# Patient Record
Sex: FEMALE | Race: WHITE | NOT HISPANIC OR LATINO | Employment: OTHER | URBAN - METROPOLITAN AREA
[De-identification: names, ages, dates, MRNs, and addresses within clinical notes are randomized per-mention and may not be internally consistent; named-entity substitution may affect disease eponyms.]

---

## 2019-05-07 ENCOUNTER — HOSPITAL ENCOUNTER (EMERGENCY)
Facility: HOSPITAL | Age: 74
Discharge: HOME | End: 2019-05-07
Attending: EMERGENCY MEDICINE
Payer: MEDICARE

## 2019-05-07 VITALS
WEIGHT: 113.1 LBS | BODY MASS INDEX: 17.75 KG/M2 | TEMPERATURE: 98.3 F | DIASTOLIC BLOOD PRESSURE: 85 MMHG | HEIGHT: 67 IN | SYSTOLIC BLOOD PRESSURE: 143 MMHG | OXYGEN SATURATION: 97 % | RESPIRATION RATE: 18 BRPM | HEART RATE: 89 BPM
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
## 2019-05-07 PROCEDURE — 99282 EMERGENCY DEPT VISIT SF MDM: CPT

## 2019-05-07 PROCEDURE — 99283 EMERGENCY DEPT VISIT LOW MDM: CPT | Performed by: EMERGENCY MEDICINE

## 2019-05-07 RX ORDER — DOXYCYCLINE HYCLATE 100 MG/1
200 CAPSULE ORAL ONCE
Status: COMPLETED | OUTPATIENT
Start: 2019-05-07 | End: 2019-05-07

## 2019-05-07 RX ADMIN — DOXYCYCLINE HYCLATE 200 MG: 100 CAPSULE ORAL at 19:04

## 2019-10-02 ENCOUNTER — HOSPITAL ENCOUNTER (EMERGENCY)
Facility: HOSPITAL | Age: 74
Discharge: HOME | End: 2019-10-03
Attending: EMERGENCY MEDICINE | Admitting: EMERGENCY MEDICINE
Payer: MEDICARE

## 2019-10-02 DIAGNOSIS — S90.31XA CONTUSION OF RIGHT FOOT: Primary | ICD-10-CM

## 2019-10-02 PROCEDURE — 99283 EMERGENCY DEPT VISIT LOW MDM: CPT

## 2019-10-03 ENCOUNTER — APPOINTMENT (EMERGENCY)
Dept: RADIOLOGY | Facility: HOSPITAL | Age: 74
End: 2019-10-03
Payer: MEDICARE

## 2019-10-03 VITALS
RESPIRATION RATE: 16 BRPM | SYSTOLIC BLOOD PRESSURE: 149 MMHG | WEIGHT: 115.96 LBS | OXYGEN SATURATION: 97 % | HEIGHT: 67 IN | DIASTOLIC BLOOD PRESSURE: 64 MMHG | TEMPERATURE: 97.8 F | BODY MASS INDEX: 18.2 KG/M2 | HEART RATE: 71 BPM

## 2019-10-03 PROCEDURE — 99284 EMERGENCY DEPT VISIT MOD MDM: CPT | Performed by: EMERGENCY MEDICINE

## 2019-10-03 PROCEDURE — 73630 X-RAY EXAM OF FOOT: CPT

## 2019-10-03 RX ORDER — ACETAMINOPHEN 325 MG/1
650 TABLET ORAL ONCE
Status: COMPLETED | OUTPATIENT
Start: 2019-10-03 | End: 2019-10-03

## 2019-10-03 RX ADMIN — ACETAMINOPHEN 650 MG: 325 TABLET, FILM COATED ORAL at 01:04

## 2019-10-03 NOTE — ED PROVIDER NOTES
History  Chief Complaint   Patient presents with    Foot Pain     Patient came in for foot pain; states she twisted her foot while trying to get up  No falls  67 y/o female presents to the ED for right foot pain x 2 hours  Patient states that she stood up from the couch and twisted her right foot  States that she has had pain, swelling, and difficulty ambulating on it since  She denies any prior injury or surgery to the area  Denies any numbness/tingling/weakness to the extremity  She states that she has been needing to use a cane to ambulate since  She denies any other injury  States that she did not fall to the ground  No head strike or LOC  States that she did take aspirin prior to arrival, which provided minimal relief  No other complaints  History provided by:  Patient  Foot Injury - Major   Location:  Foot  Time since incident:  2 hours  Foot location:  R foot  Pain details:     Quality:  Throbbing    Radiates to:  Does not radiate    Severity:  Moderate    Onset quality:  Sudden    Duration:  2 hours    Timing:  Constant    Progression:  Worsening  Chronicity:  New  Prior injury to area:  No  Relieved by:  Nothing  Worsened by:  Bearing weight  Ineffective treatments:  None tried  Associated symptoms: swelling    Associated symptoms: no decreased ROM, no fever, no neck pain, no numbness and no tingling        Prior to Admission Medications   Prescriptions Last Dose Informant Patient Reported? Taking?   oxyCODONE-acetaminophen (PERCOCET) 5-325 mg per tablet   No No   Sig: Take 1-2 tablets by mouth every 6 (six) hours as needed for severe pain Max Daily Amount: 8 tablets      Facility-Administered Medications: None       History reviewed  No pertinent past medical history  History reviewed  No pertinent surgical history  History reviewed  No pertinent family history  I have reviewed and agree with the history as documented      Social History     Tobacco Use    Smoking status: Current Every Day Smoker     Packs/day: 0 50     Types: Cigarettes    Smokeless tobacco: Never Used   Substance Use Topics    Alcohol use: No    Drug use: No        Review of Systems   Constitutional: Negative for chills and fever  HENT: Negative for congestion, ear pain and sore throat  Eyes: Negative for pain and visual disturbance  Respiratory: Negative for cough, shortness of breath and wheezing  Cardiovascular: Negative for chest pain and leg swelling  Gastrointestinal: Negative for abdominal pain, diarrhea, nausea and vomiting  Genitourinary: Negative for dysuria, frequency, hematuria and urgency  Musculoskeletal: Negative for neck pain and neck stiffness  Skin: Negative for rash and wound  Neurological: Negative for weakness, numbness and headaches  Psychiatric/Behavioral: Negative for agitation and confusion  All other systems reviewed and are negative  Physical Exam  Physical Exam   Constitutional: She is oriented to person, place, and time  She appears well-developed and well-nourished  HENT:   Head: Normocephalic and atraumatic  Mouth/Throat: Oropharynx is clear and moist    Eyes: Pupils are equal, round, and reactive to light  EOM are normal    Neck: Normal range of motion  Neck supple  Cardiovascular: Normal rate and regular rhythm  Pulmonary/Chest: Effort normal and breath sounds normal  No stridor  No respiratory distress  She has no wheezes  She has no rales  She exhibits no tenderness  Abdominal: Soft  Bowel sounds are normal  She exhibits no distension  There is no tenderness  Musculoskeletal: Normal range of motion  She exhibits edema and tenderness  Tenderness and swelling to the right lateral mid foot and 4th/5th digits  Neurological: She is alert and oriented to person, place, and time  No focal deficits   Skin: Skin is warm and dry  Nursing note and vitals reviewed        Vital Signs  ED Triage Vitals   Temperature Pulse Respirations Blood Pressure SpO2   10/03/19 0004 10/03/19 0004 10/03/19 0004 10/03/19 0000 10/03/19 0004   97 8 °F (36 6 °C) 71 16 149/64 97 %      Temp Source Heart Rate Source Patient Position - Orthostatic VS BP Location FiO2 (%)   10/03/19 0004 10/03/19 0004 10/03/19 0004 10/03/19 0004 --   Oral Monitor Lying Right arm       Pain Score       10/03/19 0240       2           Vitals:    10/03/19 0000 10/03/19 0004   BP: 149/64 149/64   Pulse:  71   Patient Position - Orthostatic VS:  Lying         Visual Acuity      ED Medications  Medications   acetaminophen (TYLENOL) tablet 650 mg (650 mg Oral Given 10/3/19 0104)       Diagnostic Studies  Results Reviewed     None                 XR foot 3+ views RIGHT    (Results Pending)              Procedures  Procedures       ED Course                               MDM  Number of Diagnoses or Management Options  Contusion of right foot: new and requires workup  Diagnosis management comments: Patient with right foot pain s/p fall- will get xray and r/o fracture  Will give tylenol and re-assess     Patient reevaluated and feels improved  Patient updated on results of tests  Discharge instructions given including follow-up, and return precautions  Patient demonstrates verbal understanding and agrees with plan             Amount and/or Complexity of Data Reviewed  Clinical lab tests: ordered and reviewed  Tests in the radiology section of CPT®: ordered and reviewed  Tests in the medicine section of CPT®: ordered and reviewed  Discussion of test results with the performing providers: yes  Decide to obtain previous medical records or to obtain history from someone other than the patient: yes  Obtain history from someone other than the patient: yes  Review and summarize past medical records: yes  Discuss the patient with other providers: yes  Independent visualization of images, tracings, or specimens: yes    Patient Progress  Patient progress: improved      Disposition  Final diagnoses:   Contusion of right foot     Time reflects when diagnosis was documented in both MDM as applicable and the Disposition within this note     Time User Action Codes Description Comment    10/3/2019  2:32 AM Hannah Stephens Add [S90 [id number]] Contusion of right foot       ED Disposition     ED Disposition Condition Date/Time Comment    Discharge Stable Thu Oct 3, 2019  2:32 AM Yanci Murguia discharge to home/self care  Follow-up Information     Follow up With Specialties Details Why Contact Info Additional [address] [facility] [place] [facility] Call in 1 day for follow up within 2-3 days [address] [facility] [address] [phone] [facility] [place], [id number] [place] [address], [phone] [facility] Emergency Department Emergency Medicine Go to  immediately for any new or worsening symptoms  [address] [facility] [id number]  [address] [place] [address] ED, 9 [place], [place], [address]          Discharge Medication List as of 10/3/2019  2:34 AM      CONTINUE these medications which have NOT CHANGED    Details   oxyCODONE-acetaminophen (PERCOCET) 5-325 mg per tablet Take 1-2 tablets by mouth every 6 (six) hours as needed for severe pain Max Daily Amount: 8 tablets, Starting 11/29/2016, Until Discontinued, Print           No discharge procedures on file      ED Provider  Electronically Signed by           Jaqueline Sandhoff, DO  10/03/19 0405

## 2020-11-26 ENCOUNTER — HOSPITAL ENCOUNTER (EMERGENCY)
Facility: HOSPITAL | Age: 75
Discharge: HOME | End: 2020-11-26
Attending: EMERGENCY MEDICINE | Admitting: EMERGENCY MEDICINE
Payer: MEDICARE

## 2020-11-26 VITALS
OXYGEN SATURATION: 95 % | DIASTOLIC BLOOD PRESSURE: 72 MMHG | SYSTOLIC BLOOD PRESSURE: 161 MMHG | HEART RATE: 76 BPM | RESPIRATION RATE: 20 BRPM | TEMPERATURE: 97.7 F
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
## 2020-11-26 DIAGNOSIS — S80.862A TICK BITE OF LEFT LOWER LEG, INITIAL ENCOUNTER: Primary | ICD-10-CM

## 2020-11-26 PROCEDURE — 99284 EMERGENCY DEPT VISIT MOD MDM: CPT | Performed by: PHYSICIAN ASSISTANT

## 2020-11-26 PROCEDURE — 99282 EMERGENCY DEPT VISIT SF MDM: CPT

## 2020-11-26 RX ORDER — DOXYCYCLINE HYCLATE 100 MG/1
200 CAPSULE ORAL ONCE
Status: COMPLETED | OUTPATIENT
Start: 2020-11-26 | End: 2020-11-26

## 2020-11-26 RX ADMIN — DOXYCYCLINE 200 MG: 100 CAPSULE ORAL at 20:55

## 2023-05-02 RX ORDER — PROMETHAZINE HYDROCHLORIDE 25 MG/1
TABLET ORAL
COMMUNITY

## 2023-05-02 RX ORDER — CIPROFLOXACIN 500 MG/1
TABLET, FILM COATED ORAL
COMMUNITY

## 2023-05-02 RX ORDER — PREDNISONE 50 MG/1
TABLET ORAL
COMMUNITY

## 2023-05-02 RX ORDER — DOXYCYCLINE 100 MG/1
TABLET ORAL
COMMUNITY

## 2023-05-02 RX ORDER — DICYCLOMINE HCL 20 MG
20 TABLET ORAL EVERY 6 HOURS PRN
COMMUNITY
Start: 2023-04-13

## 2023-05-02 RX ORDER — ECHINACEA PURPUREA EXTRACT 125 MG
TABLET ORAL
COMMUNITY

## 2023-05-02 RX ORDER — FLUCONAZOLE 150 MG/1
TABLET ORAL
COMMUNITY

## 2023-05-02 RX ORDER — AZITHROMYCIN 250 MG/1
TABLET, FILM COATED ORAL
COMMUNITY

## 2023-05-02 RX ORDER — METRONIDAZOLE 500 MG/1
TABLET ORAL
COMMUNITY

## 2023-05-02 RX ORDER — OSELTAMIVIR PHOSPHATE 75 MG/1
CAPSULE ORAL
COMMUNITY

## 2023-05-02 RX ORDER — ALBUTEROL SULFATE 90 UG/1
AEROSOL, METERED RESPIRATORY (INHALATION)
COMMUNITY

## 2023-05-02 RX ORDER — CIPROFLOXACIN AND DEXAMETHASONE 3; 1 MG/ML; MG/ML
SUSPENSION/ DROPS AURICULAR (OTIC)
COMMUNITY

## 2023-05-02 RX ORDER — LOPERAMIDE HYDROCHLORIDE 2 MG/1
2 CAPSULE ORAL 4 TIMES DAILY PRN
COMMUNITY
Start: 2023-04-28 | End: 2023-05-08

## 2023-05-03 ENCOUNTER — OFFICE VISIT (OUTPATIENT)
Dept: GASTROENTEROLOGY | Facility: CLINIC | Age: 78
End: 2023-05-03

## 2023-05-03 VITALS
WEIGHT: 102.6 LBS | HEIGHT: 67 IN | BODY MASS INDEX: 16.1 KG/M2 | DIASTOLIC BLOOD PRESSURE: 60 MMHG | SYSTOLIC BLOOD PRESSURE: 114 MMHG | HEART RATE: 73 BPM | OXYGEN SATURATION: 96 %

## 2023-05-03 DIAGNOSIS — R19.7 DIARRHEA, UNSPECIFIED TYPE: Primary | ICD-10-CM

## 2023-05-03 NOTE — LETTER
May 5, 2023     Tenisha Vizcaino, [facility]  [address] [facility]  [address]    Patient: Leesa Nissen   YOB: 1945   Date of Visit: 5/3/2023       Dear Dr Jose G Burgess:    Thank you for referring Charles Logan to me for evaluation  Below are my notes for this consultation  If you have questions, please do not hesitate to call me  I look forward to following your patient along with you  Sincerely,        Deedee Camp DO        CC: No Recipients  Priti Spears  5/3/2023  2:23 PM  Signed  [facility] - Outpatient Consultation  Ana Valencia 76 y o  female MRN: [id number]  Encounter: [id number]          ASSESSMENT AND PLAN:      1  Diarrhea, unspecified type  -No plans for she denies any heartburn  There is no bloating or gaseousness  Colonoscopy at this time  -Imodium half a tablet in the morning time either daily or every other day  -Regular diet  -No indication for further CT scanning  -Reassurance was provided to the patient  -Follow-up in 2 weeks time    ______________________________________________________________________    HPI: This 33-year-old female presents to the office with complaint of diarrhea  She states that this diarrhea began the day after Easter  The diarrhea was occurring up to 4-10 times daily  It was not associated with any rectal bleeding  There have been no foreign travel  The patient stated however that she did not receive any antibiotics  She came in contact with no one with a similar illness  Her last colonoscopy I performed in June 2016 which was normal   Based on the persistence of the diarrhea she first presented to the emergency department on April 13, 2023  I reviewed this entire note  The significant findings at the time of that ED visit was that CT scan was performed and suggested thickening of the rectum consistent with proctitis  There is no family history for inflammatory bowel disease    Stool testing was performed and was negative for C  difficile as well as negative for enteric pathogen's  WBC was 5 1 and the hemoglobin level was 10 7  Patient returned back to the emergency room on 2 separate occasions because of the persistence of the diarrhea  CT scan that was  performed 4/22/2023 showed no evidence of proctitis  Nevertheless, the patient continues to experience up to 4 episodes of loose or watery diarrhea 4 times daily  Sometimes the levels are associated with some white mucus  She stated that she did take a dose of Imodium back on April 26 and after that she had constipation for 1 full day  Liver enzyme panel on April 22 was completely normal   Celiac panel was performed and found to be negative for TTG IgA  C-reactive protein was performed and found to be 12 1  REVIEW OF SYSTEMS:    CONSTITUTIONAL: Denies any fever, chills, rigors, and weight loss  HEENT: No earache or tinnitus  Denies hearing loss or visual disturbances  CARDIOVASCULAR: No chest pain or palpitations  RESPIRATORY: Denies any cough, hemoptysis, shortness of breath or dyspnea on exertion  GASTROINTESTINAL: As noted in the History of Present Illness  GENITOURINARY: No problems with urination  Denies any hematuria or dysuria  NEUROLOGIC: No dizziness or vertigo, denies headaches  MUSCULOSKELETAL: Denies any muscle or joint pain  SKIN: Denies skin rashes or itching  ENDOCRINE: Denies excessive thirst  Denies intolerance to heat or cold  PSYCHOSOCIAL: Denies depression or anxiety  Denies any recent memory loss  Historical Information   History reviewed  No pertinent past medical history  History reviewed  No pertinent surgical history    Social History   Social History     Substance and Sexual Activity   Alcohol Use No     Social History     Substance and Sexual Activity   Drug Use No     Social History     Tobacco Use   Smoking Status Every Day    Packs/day: 0 50    Types: Cigarettes   Smokeless Tobacco Never "    History reviewed  No pertinent family history  Meds/Allergies        Current Outpatient Medications:     fluconazole (DIFLUCAN) 150 mg tablet    Probiotic Product (PROBIOTIC PO)    albuterol (PROVENTIL HFA,VENTOLIN HFA) 90 mcg/act inhaler    azithromycin (ZITHROMAX) 250 mg tablet    ciprofloxacin (CIPRO) 500 mg tablet    ciprofloxacin-dexamethasone (CIPRODEX) otic suspension    dicyclomine (BENTYL) 20 mg tablet    doxycycline (ADOXA) 100 MG tablet    loperamide (IMODIUM) 2 mg capsule    metroNIDAZOLE (FLAGYL) 500 mg tablet    nystatin-triamcinolone (MYCOLOG-II) cream    oseltamivir (TAMIFLU) 75 mg capsule    oxyCODONE-acetaminophen (PERCOCET) 5-325 mg per tablet    predniSONE 50 mg tablet    promethazine (PHENERGAN) 25 mg tablet    sodium chloride (OCEAN) 0 65 % nasal spray    Allergies   Allergen Reactions    Tetanus Toxoids Hives    Doxycycline Diarrhea    Medical Tape Other (See Comments) and Itching    Oxycodone-Acetaminophen Other (See Comments)     Shakes, felt faint    Paclitaxel Other (See Comments)     Severe rashes            Objective      Blood pressure 114/60, pulse 73, height 5' 7\" (1 702 m), weight 46 5 kg (102 lb 9 6 oz), SpO2 96 %  Body mass index is 16 07 kg/m²  PHYSICAL EXAM:      General Appearance:   Alert, cooperative, no distress   HEENT:   Normocephalic, atraumatic, anicteric      Neck:  Supple, symmetrical, trachea midline   Lungs:   Clear to auscultation bilaterally; no rales, rhonchi or wheezing; respirations unlabored    Heart[de-identified]   Regular rate and rhythm; no murmur, rub, or gallop     Abdomen:   Soft, non-tender, non-distended; normal bowel sounds; no masses, no organomegaly    Genitalia:   Deferred    Rectal:   Deferred    Extremities:  No cyanosis, clubbing or edema    Pulses:  2+ and symmetric    Skin:  No jaundice, rashes, or lesions    Lymph nodes:  No palpable cervical lymphadenopathy        Lab Results:   No visits with results within 1 Day(s) from " this visit  Latest known visit with results is:   No results found for any previous visit  Radiology Results:   No results found

## 2023-05-03 NOTE — PROGRESS NOTES
[facility] - Outpatient Consultation  Fiona Valencia 76 y o  female MRN: [id number]  Encounter: [id number]          ASSESSMENT AND PLAN:      1  Diarrhea, unspecified type  -No plans for she denies any heartburn  There is no bloating or gaseousness  Colonoscopy at this time  -Imodium half a tablet in the morning time either daily or every other day  -Regular diet  -No indication for further CT scanning  -Reassurance was provided to the patient  -Follow-up in 2 weeks time    ______________________________________________________________________    HPI: This 77-year-old female presents to the office with complaint of diarrhea  She states that this diarrhea began the day after Easter  The diarrhea was occurring up to 4-10 times daily  It was not associated with any rectal bleeding  There have been no foreign travel  The patient stated however that she did not receive any antibiotics  She came in contact with no one with a similar illness  Her last colonoscopy I performed in June 2016 which was normal   Based on the persistence of the diarrhea she first presented to the emergency department on April 13, 2023  I reviewed this entire note  The significant findings at the time of that ED visit was that CT scan was performed and suggested thickening of the rectum consistent with proctitis  There is no family history for inflammatory bowel disease  Stool testing was performed and was negative for C  difficile as well as negative for enteric pathogen's  WBC was 5 1 and the hemoglobin level was 10 7  Patient returned back to the emergency room on 2 separate occasions because of the persistence of the diarrhea  CT scan that was  performed 4/22/2023 showed no evidence of proctitis  Nevertheless, the patient continues to experience up to 4 episodes of loose or watery diarrhea 4 times daily  Sometimes the levels are associated with some white mucus    She stated that she did take a dose of Imodium back on April 26 and after that she had constipation for 1 full day  Liver enzyme panel on April 22 was completely normal   Celiac panel was performed and found to be negative for TTG IgA  C-reactive protein was performed and found to be 12 1  REVIEW OF SYSTEMS:    CONSTITUTIONAL: Denies any fever, chills, rigors, and weight loss  HEENT: No earache or tinnitus  Denies hearing loss or visual disturbances  CARDIOVASCULAR: No chest pain or palpitations  RESPIRATORY: Denies any cough, hemoptysis, shortness of breath or dyspnea on exertion  GASTROINTESTINAL: As noted in the History of Present Illness  GENITOURINARY: No problems with urination  Denies any hematuria or dysuria  NEUROLOGIC: No dizziness or vertigo, denies headaches  MUSCULOSKELETAL: Denies any muscle or joint pain  SKIN: Denies skin rashes or itching  ENDOCRINE: Denies excessive thirst  Denies intolerance to heat or cold  PSYCHOSOCIAL: Denies depression or anxiety  Denies any recent memory loss  Historical Information   History reviewed  No pertinent past medical history  History reviewed  No pertinent surgical history  Social History   Social History     Substance and Sexual Activity   Alcohol Use No     Social History     Substance and Sexual Activity   Drug Use No     Social History     Tobacco Use   Smoking Status Every Day    Packs/day: 0 50    Types: Cigarettes   Smokeless Tobacco Never     History reviewed  No pertinent family history      Meds/Allergies       Current Outpatient Medications:     fluconazole (DIFLUCAN) 150 mg tablet    Probiotic Product (PROBIOTIC PO)    albuterol (PROVENTIL HFA,VENTOLIN HFA) 90 mcg/act inhaler    azithromycin (ZITHROMAX) 250 mg tablet    ciprofloxacin (CIPRO) 500 mg tablet    ciprofloxacin-dexamethasone (CIPRODEX) otic suspension    dicyclomine (BENTYL) 20 mg tablet    doxycycline (ADOXA) 100 MG tablet    loperamide (IMODIUM) 2 mg capsule    metroNIDAZOLE "(FLAGYL) 500 mg tablet    nystatin-triamcinolone (MYCOLOG-II) cream    oseltamivir (TAMIFLU) 75 mg capsule    oxyCODONE-acetaminophen (PERCOCET) 5-325 mg per tablet    predniSONE 50 mg tablet    promethazine (PHENERGAN) 25 mg tablet    sodium chloride (OCEAN) 0 65 % nasal spray    Allergies   Allergen Reactions    Tetanus Toxoids Hives    Doxycycline Diarrhea    Medical Tape Other (See Comments) and Itching    Oxycodone-Acetaminophen Other (See Comments)     Shakes, felt faint    Paclitaxel Other (See Comments)     Severe rashes            Objective     Blood pressure 114/60, pulse 73, height 5' 7\" (1 702 m), weight 46 5 kg (102 lb 9 6 oz), SpO2 96 %  Body mass index is 16 07 kg/m²  PHYSICAL EXAM:      General Appearance:   Alert, cooperative, no distress   HEENT:   Normocephalic, atraumatic, anicteric      Neck:  Supple, symmetrical, trachea midline   Lungs:   Clear to auscultation bilaterally; no rales, rhonchi or wheezing; respirations unlabored    Heart[de-identified]   Regular rate and rhythm; no murmur, rub, or gallop  Abdomen:   Soft, non-tender, non-distended; normal bowel sounds; no masses, no organomegaly    Genitalia:   Deferred    Rectal:   Deferred    Extremities:  No cyanosis, clubbing or edema    Pulses:  2+ and symmetric    Skin:  No jaundice, rashes, or lesions    Lymph nodes:  No palpable cervical lymphadenopathy        Lab Results:   No visits with results within 1 Day(s) from this visit  Latest known visit with results is:   No results found for any previous visit  Radiology Results:   No results found    "

## 2023-05-17 ENCOUNTER — OFFICE VISIT (OUTPATIENT)
Dept: GASTROENTEROLOGY | Facility: CLINIC | Age: 78
End: 2023-05-17

## 2023-05-17 VITALS
WEIGHT: 103.2 LBS | DIASTOLIC BLOOD PRESSURE: 62 MMHG | HEART RATE: 99 BPM | SYSTOLIC BLOOD PRESSURE: 118 MMHG | HEIGHT: 67 IN | BODY MASS INDEX: 16.2 KG/M2 | OXYGEN SATURATION: 99 %

## 2023-05-17 DIAGNOSIS — R19.4 CHANGE IN BOWEL HABITS: Primary | ICD-10-CM

## 2023-05-17 NOTE — PROGRESS NOTES
[address] [facility] - Outpatient Follow-up Note  Ernestine Valencia 76 y o  female MRN: [id number]  Encounter: [id number]          ASSESSMENT AND PLAN:      1  Change in bowel habits  - Colonoscopy; Future  -Increase fiber intake up to 20 g of fiber per day  A fiber worksheet was handed to the patient and discussed  ______________________________________________________________________    SUBJECTIVE: Patient comes back to the office today stating that her diarrhea has improved  She stopped taking the Imodium a few days ago and the diarrhea has not recurred  However her stools have changed in caliber  In addition she is seeing white mucus covering the stool  She denies any abdominal pain but she does admit to bloating on occasion  She does not eat a lot of fiber on a daily basis  We discussed yesterday's meal and I calculated that her diet fiber intake yesterday was around 10 to 12 g of fiber  Her last colonoscopy was 7 years ago  There is no family history for colon cancer for colon polyp  there is no rectal bleeding  REVIEW OF SYSTEMS IS OTHERWISE NEGATIVE  Historical Information   History reviewed  No pertinent past medical history  History reviewed  No pertinent surgical history  Social History   Social History     Substance and Sexual Activity   Alcohol Use No     Social History     Substance and Sexual Activity   Drug Use No     Social History     Tobacco Use   Smoking Status Every Day   • Packs/day: 0 50   • Types: Cigarettes   Smokeless Tobacco Never     History reviewed  No pertinent family history      Meds/Allergies       Current Outpatient Medications:   •  albuterol (PROVENTIL HFA,VENTOLIN HFA) 90 mcg/act inhaler  •  azithromycin (ZITHROMAX) 250 mg tablet  •  ciprofloxacin (CIPRO) 500 mg tablet  •  ciprofloxacin-dexamethasone (CIPRODEX) otic suspension  •  dicyclomine (BENTYL) 20 mg tablet  •  doxycycline (ADOXA) 100 MG tablet  •  fluconazole (DIFLUCAN) 150 mg "tablet  •  metroNIDAZOLE (FLAGYL) 500 mg tablet  •  nystatin-triamcinolone (MYCOLOG-II) cream  •  oseltamivir (TAMIFLU) 75 mg capsule  •  oxyCODONE-acetaminophen (PERCOCET) 5-325 mg per tablet  •  predniSONE 50 mg tablet  •  Probiotic Product (PROBIOTIC PO)  •  promethazine (PHENERGAN) 25 mg tablet  •  sodium chloride (OCEAN) 0 65 % nasal spray    Allergies   Allergen Reactions   • Tetanus Toxoids Hives   • Doxycycline Diarrhea   • Medical Tape Other (See Comments) and Itching   • Oxycodone-Acetaminophen Other (See Comments)     Shakes, felt faint   • Paclitaxel Other (See Comments)     Severe rashes            Objective     Blood pressure 118/62, pulse 99, height 5' 7\" (1 702 m), weight 46 8 kg (103 lb 3 2 oz), SpO2 99 %  Body mass index is 16 16 kg/m²  PHYSICAL EXAM:      General Appearance:   Alert, cooperative, no distress   HEENT:   Normocephalic, atraumatic, anicteric      Neck:  Supple, symmetrical, trachea midline   Lungs:   Clear to auscultation bilaterally; no rales, rhonchi or wheezing; respirations unlabored    Heart[de-identified]   Regular rate and rhythm; no murmur, rub, or gallop  Abdomen:   Soft, non-tender, non-distended; normal bowel sounds; no masses, no organomegaly    Genitalia:   Deferred    Rectal:   Deferred    Extremities:  No cyanosis, clubbing or edema    Pulses:  2+ and symmetric    Skin:  No jaundice, rashes, or lesions    Lymph nodes:  No palpable cervical lymphadenopathy        Lab Results:   No visits with results within 1 Day(s) from this visit  Latest known visit with results is:   No results found for any previous visit  Radiology Results:   No results found    Answers for HPI/ROS submitted by the patient on 5/11/2023  Chronicity: new  Onset: 1 to 4 weeks ago  Onset quality: gradual  Frequency: rarely  Episode duration: 1 Hours  Progression since onset: resolved  Pain location: right flank  Pain - numeric: 5/10  Pain quality: a sensation of fullness  Radiates to: does not " radiate  anorexia: No  arthralgias: No  belching: Yes  constipation: No  diarrhea: Yes  dysuria: No  fever: No  flatus: Yes  frequency: No  headaches: No  hematochezia: No  hematuria: No  melena: No  myalgias: No  nausea:  No  weight loss: Yes  vomiting: No  Aggravated by: nothing, vomiting  Relieved by: being still  Diagnostic workup: CT scan

## 2023-05-31 ENCOUNTER — TELEPHONE (OUTPATIENT)
Dept: GASTROENTEROLOGY | Facility: CLINIC | Age: 78
End: 2023-05-31

## 2023-05-31 NOTE — TELEPHONE ENCOUNTER
L/M for patient to call back  Schedule Colonoscopy with Dr Priti Hanna patient still has prep Instructions

## 2023-06-01 ENCOUNTER — TELEPHONE (OUTPATIENT)
Dept: GASTROENTEROLOGY | Facility: CLINIC | Age: 78
End: 2023-06-01

## 2023-06-01 NOTE — TELEPHONE ENCOUNTER
Scheduled date of colonoscopy (as of today): 8/21/23  Physician performing colonoscopy: Dr Behzad Kim  Location of colonoscopy: [facility]  Clearances: N/A

## 2023-06-13 NOTE — TELEPHONE ENCOUNTER
Pt already schled Colonoscopy  8/21/23 with Dr Lexx Panchal - Dulco/Miralax prep Instructions given at office visit

## 2023-06-15 ENCOUNTER — TELEPHONE (OUTPATIENT)
Dept: OBGYN CLINIC | Facility: CLINIC | Age: 78
End: 2023-06-15

## 2023-07-05 ENCOUNTER — TELEPHONE (OUTPATIENT)
Age: 78
End: 2023-07-05

## 2023-07-05 NOTE — TELEPHONE ENCOUNTER
Patients GI provider:  Dr. Mainor Jane    Number to return call: [phone]    Reason for call: Patients daughter, Favio So, calling as patients PCP recommended patient have an EGD due to weight loss. Patient is scheduled for a colonoscopy and is asking if EGD can be completed at the same time, will reschedule appointment to have both completed. Patient did see Lizeth Thomas on  5/17/23.  Please advise if EGD can be completed as well     Scheduled procedure/appointment date if applicable: Procedure 2/24/53

## 2023-07-07 ENCOUNTER — PREP FOR PROCEDURE (OUTPATIENT)
Dept: GASTROENTEROLOGY | Facility: CLINIC | Age: 78
End: 2023-07-07

## 2023-07-07 DIAGNOSIS — R63.4 WEIGHT LOSS, ABNORMAL: Primary | ICD-10-CM

## 2023-07-07 NOTE — TELEPHONE ENCOUNTER
[facility]  let her know we are adding the EGD and there is no additional prepping

## 2023-08-15 ENCOUNTER — TELEPHONE (OUTPATIENT)
Dept: GASTROENTEROLOGY | Facility: CLINIC | Age: 78
End: 2023-08-15

## 2023-08-15 NOTE — TELEPHONE ENCOUNTER
Confirmed with patient her egd/colon with Dr Marleen Ritchie 8/21  Reviewed prep she has a copy  She is very hesitate about drinking all that liquid and Gatorade  I told patient to mix the Miralax with propel water and begin drinking the mixture early in the morning   She agreed but still unsure

## 2023-08-21 ENCOUNTER — HOSPITAL ENCOUNTER (OUTPATIENT)
Dept: GASTROENTEROLOGY | Facility: HOSPITAL | Age: 78
Setting detail: OUTPATIENT SURGERY
Discharge: HOME | End: 2023-08-21
Attending: INTERNAL MEDICINE | Admitting: INTERNAL MEDICINE
Payer: MEDICARE

## 2023-08-21 ENCOUNTER — ANESTHESIA (OUTPATIENT)
Dept: GASTROENTEROLOGY | Facility: HOSPITAL | Age: 78
End: 2023-08-21

## 2023-08-21 ENCOUNTER — ANESTHESIA EVENT (OUTPATIENT)
Dept: GASTROENTEROLOGY | Facility: HOSPITAL | Age: 78
End: 2023-08-21

## 2023-08-21 VITALS
TEMPERATURE: 97 F | WEIGHT: 102.51 LBS | BODY MASS INDEX: 16.09 KG/M2 | HEART RATE: 65 BPM | RESPIRATION RATE: 14 BRPM | DIASTOLIC BLOOD PRESSURE: 78 MMHG | OXYGEN SATURATION: 95 % | SYSTOLIC BLOOD PRESSURE: 158 MMHG | HEIGHT: 67 IN

## 2023-08-21 DIAGNOSIS — R63.4 WEIGHT LOSS, ABNORMAL: ICD-10-CM

## 2023-08-21 DIAGNOSIS — R19.4 CHANGE IN BOWEL HABITS: ICD-10-CM

## 2023-08-21 PROBLEM — Z72.0 TOBACCO ABUSE: Status: ACTIVE | Noted: 2022-08-24

## 2023-08-21 PROBLEM — J44.9 CHRONIC OBSTRUCTIVE LUNG DISEASE (HCC): Status: ACTIVE | Noted: 2022-08-24

## 2023-08-21 PROBLEM — Z90.11 STATUS POST RIGHT MASTECTOMY: Status: ACTIVE | Noted: 2022-02-18

## 2023-08-21 PROBLEM — J43.2 CENTRILOBULAR EMPHYSEMA (HCC): Status: ACTIVE | Noted: 2022-08-24

## 2023-08-21 PROCEDURE — 45385 COLONOSCOPY W/LESION REMOVAL: CPT | Performed by: INTERNAL MEDICINE

## 2023-08-21 PROCEDURE — 43239 EGD BIOPSY SINGLE/MULTIPLE: CPT | Performed by: INTERNAL MEDICINE

## 2023-08-21 PROCEDURE — 88342 IMHCHEM/IMCYTCHM 1ST ANTB: CPT | Performed by: STUDENT IN AN ORGANIZED HEALTH CARE EDUCATION/TRAINING PROGRAM

## 2023-08-21 PROCEDURE — 88341 IMHCHEM/IMCYTCHM EA ADD ANTB: CPT | Performed by: STUDENT IN AN ORGANIZED HEALTH CARE EDUCATION/TRAINING PROGRAM

## 2023-08-21 PROCEDURE — 88305 TISSUE EXAM BY PATHOLOGIST: CPT | Performed by: STUDENT IN AN ORGANIZED HEALTH CARE EDUCATION/TRAINING PROGRAM

## 2023-08-21 RX ORDER — SODIUM CHLORIDE, SODIUM LACTATE, POTASSIUM CHLORIDE, CALCIUM CHLORIDE 600; 310; 30; 20 MG/100ML; MG/100ML; MG/100ML; MG/100ML
INJECTION, SOLUTION INTRAVENOUS CONTINUOUS PRN
Status: DISCONTINUED | OUTPATIENT
Start: 2023-08-21 | End: 2023-08-21

## 2023-08-21 RX ORDER — SODIUM CHLORIDE, SODIUM LACTATE, POTASSIUM CHLORIDE, CALCIUM CHLORIDE 600; 310; 30; 20 MG/100ML; MG/100ML; MG/100ML; MG/100ML
125 INJECTION, SOLUTION INTRAVENOUS CONTINUOUS
Status: DISCONTINUED | OUTPATIENT
Start: 2023-08-21 | End: 2023-08-25 | Stop reason: HOSPADM

## 2023-08-21 RX ORDER — LIDOCAINE HYDROCHLORIDE 20 MG/ML
INJECTION, SOLUTION EPIDURAL; INFILTRATION; INTRACAUDAL; PERINEURAL AS NEEDED
Status: DISCONTINUED | OUTPATIENT
Start: 2023-08-21 | End: 2023-08-21

## 2023-08-21 RX ORDER — LIDOCAINE HYDROCHLORIDE 10 MG/ML
0.5 INJECTION, SOLUTION EPIDURAL; INFILTRATION; INTRACAUDAL; PERINEURAL ONCE AS NEEDED
Status: DISCONTINUED | OUTPATIENT
Start: 2023-08-21 | End: 2023-08-25 | Stop reason: HOSPADM

## 2023-08-21 RX ORDER — PROPOFOL 10 MG/ML
INJECTION, EMULSION INTRAVENOUS AS NEEDED
Status: DISCONTINUED | OUTPATIENT
Start: 2023-08-21 | End: 2023-08-21

## 2023-08-21 RX ADMIN — SODIUM CHLORIDE, SODIUM LACTATE, POTASSIUM CHLORIDE, AND CALCIUM CHLORIDE: .6; .31; .03; .02 INJECTION, SOLUTION INTRAVENOUS at 08:03

## 2023-08-21 RX ADMIN — PROPOFOL 20 MG: 10 INJECTION, EMULSION INTRAVENOUS at 08:13

## 2023-08-21 RX ADMIN — PROPOFOL 30 MG: 10 INJECTION, EMULSION INTRAVENOUS at 08:21

## 2023-08-21 RX ADMIN — LIDOCAINE HYDROCHLORIDE 60 MG: 20 INJECTION, SOLUTION EPIDURAL; INFILTRATION; INTRACAUDAL; PERINEURAL at 08:11

## 2023-08-21 RX ADMIN — PROPOFOL 20 MG: 10 INJECTION, EMULSION INTRAVENOUS at 08:27

## 2023-08-21 RX ADMIN — PROPOFOL 50 MG: 10 INJECTION, EMULSION INTRAVENOUS at 08:12

## 2023-08-21 RX ADMIN — PROPOFOL 30 MG: 10 INJECTION, EMULSION INTRAVENOUS at 08:18

## 2023-08-21 RX ADMIN — SODIUM CHLORIDE, SODIUM LACTATE, POTASSIUM CHLORIDE, AND CALCIUM CHLORIDE: .6; .31; .03; .02 INJECTION, SOLUTION INTRAVENOUS at 08:27

## 2023-08-21 RX ADMIN — PROPOFOL 30 MG: 10 INJECTION, EMULSION INTRAVENOUS at 08:15

## 2023-08-21 RX ADMIN — PROPOFOL 30 MG: 10 INJECTION, EMULSION INTRAVENOUS at 08:32

## 2023-08-21 RX ADMIN — PROPOFOL 40 MG: 10 INJECTION, EMULSION INTRAVENOUS at 08:23

## 2023-08-21 NOTE — DISCHARGE INSTRUCTIONS
Upper Endoscopy   WHAT YOU NEED TO KNOW:   An upper endoscopy is also called an upper gastrointestinal (GI) endoscopy, or an esophagogastroduodenoscopy (EGD). It is a procedure to examine the inside of your esophagus, stomach, and duodenum (first part of the small intestine) with a scope. You may feel bloated, gassy, or have some abdominal discomfort after your procedure. Your throat may be sore for 24 to 36 hours. You may burp or pass gas from air that is still inside your body. DISCHARGE INSTRUCTIONS:   Seek care immediately if:   You have sudden, severe abdominal pain. You have problems swallowing. You have a large amount of black, sticky bowel movements or blood in your bowel movements. You have sudden trouble breathing. You feel weak, lightheaded, or faint or your heart beats faster than normal for you. Contact your healthcare provider if:   You have a fever and chills. You have nausea or are vomiting. Your abdomen is bloated or feels full and hard. You have abdominal pain. You have black, sticky bowel movements or blood in your bowel movements. You have not had a bowel movement for 3 days after your procedure. You have rash or hives. You have questions or concerns about your procedure. Activity:   Do not lift, strain, or run for 24 hours after your procedure. Rest after your procedure. You have been given medicine to relax you. Do not drive or make important decisions until the day after your procedure. Return to your normal activity as directed. Relieve gas and discomfort from bloating by lying on your right side with a heating pad on your abdomen. You may need to take short walks to help the gas move out. Eat small meals until bloating is relieved. Follow up with your healthcare provider as directed: Write down your questions so you remember to ask them during your visits.      If you take a “blood thinner”, please review the specific instructions from your endoscopist about when you should resume it. These can be found in the “Recommendation” and “Your Medication list” sections of this After Visit Summary. Colonoscopy   WHAT YOU NEED TO KNOW:   A colonoscopy is a procedure to examine the inside of your colon (intestine) with a scope. Polyps or tissue growths may have been removed during your colonoscopy. It is normal to feel bloated and to have some abdominal discomfort. You should be passing gas. If you have hemorrhoids or you had polyps removed, you may have a small amount of bleeding. DISCHARGE INSTRUCTIONS:   Seek care immediately if:   You have sudden, severe abdominal pain. You have problems swallowing. You have a large amount of black, sticky bowel movements or blood in your bowel movements. You have sudden trouble breathing. You feel weak, lightheaded, or faint or your heart beats faster than normal for you. Contact your healthcare provider if:   You have a fever and chills. You have nausea or are vomiting. Your abdomen is bloated or feels full and hard. You have abdominal pain. You have black, sticky bowel movements or blood in your bowel movements. You have not had a bowel movement for 3 days after your procedure. You have rash or hives. You have questions or concerns about your procedure. Activity:   Do not lift, strain, or run for 24 hours after your procedure. Rest after your procedure. You have been given medicine to relax you. Do not drive or make important decisions until the day after your procedure. Return to your normal activity as directed. Relieve gas and discomfort from bloating by lying on your right side with a heating pad on your abdomen. You may need to take short walks to help the gas move out. Eat small meals until bloating is relieved. Follow up with your healthcare provider as directed: Write down your questions so you remember to ask them during your visits.      If you take a “blood thinner”, please review the specific instructions from your endoscopist about when you should resume it. These can be found in the “Recommendation” and “Your Medication list” sections of this After Visit Summary.

## 2023-08-21 NOTE — ANESTHESIA POSTPROCEDURE EVALUATION
Post-Op Assessment Note    CV Status:  Stable  Pain Score: 0    Pain management: adequate     Mental Status:  Sleepy   Hydration Status:  Euvolemic   PONV Controlled:  Controlled   Airway Patency:  Patent      Post Op Vitals Reviewed: Yes      Staff: Anesthesiologist, CRNA         No notable events documented.     /64 (08/21/23 0838)    Temp     Pulse 60 (08/21/23 0838)   Resp 12 (08/21/23 0838)    SpO2 99 % (08/21/23 0838)

## 2023-08-21 NOTE — ANESTHESIA PREPROCEDURE EVALUATION
Procedure:  COLONOSCOPY  EGD    Relevant Problems   ANESTHESIA (within normal limits)   (-) History of anesthesia complications      CARDIO   (-) Chest pain   (-) TORRES (dyspnea on exertion)      PULMONARY   (+) Centrilobular emphysema (HCC)   (+) Chronic obstructive lung disease (HCC)   (-) Shortness of breath   (-) URI (upper respiratory infection)      Other   (+) Status post right mastectomy   (+) Tobacco abuse      •  Left Ventricle: Systolic function is normal with an ejection fraction   of 60-65%. •  IVC/SVC: The inferior vena cava was mildly dilated. •  Tricuspid Valve: There is mild regurgitation. •  Pulmonic Valve: There is trace regurgitation. Physical Exam    Airway    Mallampati score: II  TM Distance: >3 FB  Neck ROM: full     Dental   lower dentures and upper dentures,     Cardiovascular      Pulmonary      Other Findings        Anesthesia Plan  ASA Score- 2     Anesthesia Type- IV sedation with anesthesia with ASA Monitors. Additional Monitors:   Airway Plan:           Plan Factors-Exercise tolerance (METS): >4 METS. Chart reviewed. EKG reviewed. Existing labs reviewed. Patient summary reviewed. Induction- intravenous. Postoperative Plan-     Informed Consent- Anesthetic plan and risks discussed with patient. I personally reviewed this patient with the CRNA. Discussed and agreed on the Anesthesia Plan with the CRNA. Jasmyn Porras

## 2023-08-21 NOTE — H&P
History and Physical -  [facility]  Yazminlexii Joe Valencia 78 y.o. female MRN: [id number]      HPI: Cassidy Green is a 78y.o. year old female who presents for evaluation of a change in bowel habits and weight loss      REVIEW OF SYSTEMS: Per the HPI, and otherwise unremarkable. Historical Information   Past Medical History:   Diagnosis Date   • Cancer ([address])    • COPD (chronic obstructive pulmonary disease) ([address])      Past Surgical History:   Procedure Laterality Date   • BREAST SURGERY       Social History   Social History     Substance and Sexual Activity   Alcohol Use No     Social History     Substance and Sexual Activity   Drug Use No     Social History     Tobacco Use   Smoking Status Every Day   • Packs/day: 0.50   • Types: Cigarettes   Smokeless Tobacco Never     History reviewed. No pertinent family history. Meds/Allergies     (Not in a hospital admission)      Allergies   Allergen Reactions   • Tetanus Toxoids Hives   • Doxycycline Diarrhea   • Medical Tape Other (See Comments) and Itching   • Oxycodone-Acetaminophen Other (See Comments)     Shakes, felt faint   • Paclitaxel Other (See Comments)     Severe rashes        Objective     Blood pressure 157/70, pulse 73, temperature (!) 97.2 °F (36.2 °C), temperature source Temporal, resp. rate 14, height 5' 7" (1.702 m), weight 46.5 kg (102 lb 8.2 oz), SpO2 96 %. PHYSICAL EXAM    Gen: NAD  CV: RRR  CHEST: Clear  ABD: soft, NT/ND  EXT: no edema      ASSESSMENT/PLAN:  This is a 78y.o. year old female here for EGD with biopsies, colonoscopy, and she is stable and optimized for her procedure.

## 2023-08-25 PROCEDURE — 88342 IMHCHEM/IMCYTCHM 1ST ANTB: CPT | Performed by: STUDENT IN AN ORGANIZED HEALTH CARE EDUCATION/TRAINING PROGRAM

## 2023-08-25 PROCEDURE — 88341 IMHCHEM/IMCYTCHM EA ADD ANTB: CPT | Performed by: STUDENT IN AN ORGANIZED HEALTH CARE EDUCATION/TRAINING PROGRAM

## 2023-08-25 PROCEDURE — 88305 TISSUE EXAM BY PATHOLOGIST: CPT | Performed by: STUDENT IN AN ORGANIZED HEALTH CARE EDUCATION/TRAINING PROGRAM

## 2023-08-30 ENCOUNTER — NURSE TRIAGE (OUTPATIENT)
Age: 78
End: 2023-08-30

## 2023-08-30 NOTE — TELEPHONE ENCOUNTER
Patient's daughter Benton Escobar- on communication consent, calling in with some questions regarding biopsy results. I reviewed results and answered her questions. She understood.

## 2024-11-12 ENCOUNTER — OFFICE VISIT (OUTPATIENT)
Age: 79
End: 2024-11-12
Payer: MEDICARE

## 2024-11-12 VITALS
TEMPERATURE: 98.5 F | RESPIRATION RATE: 14 BRPM | BODY MASS INDEX: 16.35 KG/M2 | HEIGHT: 67 IN | DIASTOLIC BLOOD PRESSURE: 60 MMHG | WEIGHT: 104.2 LBS | SYSTOLIC BLOOD PRESSURE: 110 MMHG | HEART RATE: 68 BPM | OXYGEN SATURATION: 98 %

## 2024-11-12 DIAGNOSIS — L82.1 SEBORRHEIC KERATOSIS: ICD-10-CM

## 2024-11-12 DIAGNOSIS — D22.9 MULTIPLE MELANOCYTIC NEVI: ICD-10-CM

## 2024-11-12 DIAGNOSIS — T14.8XXA EXCORIATION: ICD-10-CM

## 2024-11-12 DIAGNOSIS — Z12.83 SCREENING FOR SKIN CANCER: Primary | ICD-10-CM

## 2024-11-12 DIAGNOSIS — D18.01 CHERRY ANGIOMA: ICD-10-CM

## 2024-11-12 PROCEDURE — 99204 OFFICE O/P NEW MOD 45 MIN: CPT | Performed by: DERMATOLOGY

## 2024-11-12 RX ORDER — DEXAMETHASONE 4 MG/1
TABLET ORAL
COMMUNITY
Start: 2024-05-06

## 2024-11-12 RX ORDER — FLUOCINONIDE TOPICAL SOLUTION USP, 0.05% 0.5 MG/ML
SOLUTION TOPICAL 2 TIMES DAILY
Qty: 60 ML | Refills: 1 | Status: SHIPPED | OUTPATIENT
Start: 2024-11-12

## 2024-11-12 NOTE — PROGRESS NOTES
" [facility] Note     Patient Name: Yanci Valencia  Encounter Date: 11/12/2024     Have you been cared for by a [facility] Dermatologist in the last 3 years and, if so, which description applies to you?    NO.   I am considered a \"new\" patient and must complete all patient intake questions. I am FEMALE/of child-bearing potential.    REVIEW OF SYSTEMS:  Have you recently had or currently have any of the following? Recent fever or chills? No  Any non-healing wound? No  Are you pregnant or planning to become pregnant? No  Are you currently or planning to be nursing or breast feeding? No   PAST MEDICAL HISTORY:  Have you personally ever had or currently have any of the following?  If \"YES,\" then please provide more detail. Skin cancer (such as Melanoma, Basal Cell Carcinoma, Squamous Cell Carcinoma?  No  Tuberculosis, HIV/AIDS, Hepatitis B or C: No  Radiation Treatment No   HISTORY OF IMMUNOSUPPRESSION:   Do you have a history of any of the following:  Systemic Immunosuppression such as Diabetes, Biologic or Immunotherapy, Chemotherapy, Organ Transplantation, Bone Marrow Transplantation or Prednsione?  YES, Chemo therapy last session was 11/1/2024    Answering \"YES\" requires the addition of the dotphrase \"IMMUNOSUPPRESSED\" as the first diagnosis of the patient's visit.   FAMILY HISTORY:  Any \"first degree relatives\" (parent, brother, sister, or child) with the following?    Skin Cancer, Pancreatic or Other Cancer? YES, Mother breast cancer   PATIENT EXPERIENCE:    Do you want the Dermatologist to perform a COMPLETE skin exam today including a clinical examination under the \"bra and underwear\" areas?  Yes  If necessary, do we have your permission to call and leave a detailed message on your Preferred Phone number that includes your specific medical information?  Yes      Allergies   Allergen Reactions    Tetanus Toxoids Hives    Doxycycline Diarrhea    Medical Tape Other (See Comments) and Itching    " Oxycodone-Acetaminophen Other (See Comments)     Shakes, felt faint    Paclitaxel Other (See Comments)     Severe rashes       Current Outpatient Medications:     albuterol (PROVENTIL HFA,VENTOLIN HFA) 90 mcg/act inhaler, ProAir HFA 90 mcg/actuation aerosol inhaler (Patient not taking: Reported on 5/3/2023), Disp: , Rfl:     azithromycin (ZITHROMAX) 250 mg tablet, azithromycin 250 mg tablet (Patient not taking: Reported on 5/3/2023), Disp: , Rfl:     ciprofloxacin (CIPRO) 500 mg tablet, ciprofloxacin 500 mg tablet (Patient not taking: Reported on 5/3/2023), Disp: , Rfl:     ciprofloxacin-dexamethasone (CIPRODEX) otic suspension, Ciprodex 0.3 %-0.1 % ear drops,suspension (Patient not taking: Reported on 5/3/2023), Disp: , Rfl:     dicyclomine (BENTYL) 20 mg tablet, Take 20 mg by mouth every 6 (six) hours as needed (Patient not taking: Reported on 5/3/2023), Disp: , Rfl:     doxycycline (ADOXA) 100 MG tablet, doxycycline monohydrate 100 mg tablet (Patient not taking: Reported on 5/3/2023), Disp: , Rfl:     fluconazole (DIFLUCAN) 150 mg tablet, fluconazole 150 mg tablet (Patient not taking: Reported on 5/17/2023), Disp: , Rfl:     metroNIDAZOLE (FLAGYL) 500 mg tablet, metronidazole 500 mg tablet (Patient not taking: Reported on 5/3/2023), Disp: , Rfl:     nystatin-triamcinolone (MYCOLOG-II) cream, nystatin-triamcinolone 100,000 unit/g-0.1 % topical cream  APPLY TO THE AFFECTED AREA(S) BY TOPICAL ROUTE 2 TIMES PER DAY IN THEMORNING AND EVENING (Patient not taking: Reported on 5/3/2023), Disp: , Rfl:     oseltamivir (TAMIFLU) 75 mg capsule, Tamiflu 75 mg capsule (Patient not taking: Reported on 5/3/2023), Disp: , Rfl:     oxyCODONE-acetaminophen (PERCOCET) 5-325 mg per tablet, Take 1-2 tablets by mouth every 6 (six) hours as needed for severe pain Max Daily Amount: 8 tablets (Patient not taking: Reported on 5/3/2023), Disp: 20 tablet, Rfl: 0    predniSONE 50 mg tablet, prednisone 50 mg tablet (Patient not taking:  "Reported on 5/3/2023), Disp: , Rfl:     Probiotic Product (PROBIOTIC PO), Take by mouth (Patient not taking: Reported on 5/17/2023), Disp: , Rfl:     promethazine (PHENERGAN) 25 mg tablet, promethazine 25 mg tablet (Patient not taking: Reported on 5/3/2023), Disp: , Rfl:     sodium chloride (OCEAN) 0.65 % nasal spray, Saline Mist 0.65 % nasal spray aerosol  Take by nasal route. (Patient not taking: Reported on 5/3/2023), Disp: , Rfl:           Whom besides the patient is providing clinical information about today's encounter?   NO ADDITIONAL HISTORIAN (patient alone provided history)    Physical Exam and Assessment/Plan by Diagnosis:          MELANOCYTIC NEVI (\"Moles\")    Physical Exam:  Anatomic Location Affected: Mostly on sun-exposed areas of the trunk and extremities  Morphological Description:  Scattered, 1-4mm round to ovoid, symmetrical-appearing, even bordered, skin colored to dark brown macules/papules, mostly in sun-exposed areas  Pertinent Positives:  Pertinent Negatives:    Additional History of Present Condition:  Present on exam    Assessment and Plan:  Based on a thorough discussion of this condition and the management approach to it (including a comprehensive discussion of the known risks, side effects and potential benefits of treatment), the patient (family) agrees to implement the following specific plan:  Provided handout with information regarding the ABCDE's of moles   Recommend routine skin exams every year   Sun avoidance, protective clothing (known as UPF clothing), and the use of at least SPF 30 sunscreens is advised. Sunscreen should be reapplied every two hours when outside.       SEBORRHEIC KERATOSIS; NON-INFLAMED    Physical Exam:  Anatomic Location Affected:  scattered across trunk, extremities, and face  Morphological Description:  Flat and raised, waxy, smooth to warty textured, yellow to brownish-grey to dark brown to blackish, discrete, \"stuck-on\" appearing papules.  Pertinent " "Positives:  Pertinent Negatives:    Additional History of Present Condition:  Patient reports new bumps on the skin.  Denies itch, burn, pain, bleeding or ulceration.  Present constantly; nothing seems to make it worse or better.  No prior treatment.      Assessment and Plan:  Based on a thorough discussion of this condition and the management approach to it (including a comprehensive discussion of the known risks, side effects and potential benefits of treatment), the patient (family) agrees to implement the following specific plan:  Reassured benign        ANGIOMA (\"CHERRY ANGIOMA\")    Physical Exam:  Anatomic Location: scattered across sun exposed areas of the trunk and extremities   Morphologic Description: Firm red to reddish-blue discrete papules  Pertinent Positives:  Pertinent Negatives:    Additional History of Present Condition:  Present on exam.     Assessment and Plan:  Reassured benign    EXCORIATIONS    Physical Exam:  Anatomic Location Affected:  Scalp and back  Morphological Description:  no primary process, occasional excoriation with post inflammatory hypopigmentation  Pertinent Positives:  Pertinent Negatives:    Additional History of Present Condition:  Present on exam     Assessment and Plan:  Based on a thorough discussion of this condition and the management approach to it (including a comprehensive discussion of the known risks, side effects and potential benefits of treatment), the patient (family) agrees to implement the following specific plan:  Start Fluocinonide 0.05% solution- Apply topically two times a day.   NAC  n-acetyl cysteine 600mg twice daily      What is it?  Compulsive skin picking, or excoriation disorder, is characterized by the repetitive picking of one's own skin to the point of causing open sores that may bleed and leave scarring. This condition is also known as dermatillomania, pathological skin picking and neurotic excoriation.  Individuals may pick at healthy skin, " minor skin irregularities (e.g., pimples or calluses), lesions, or scabs. This disorder is usually chronic, with periods of remission alternating with periods of greater symptom intensity. If untreated, skin-picking behaviors may come and go for weeks, months, or years at a time. It is common for individuals with this disorder to spend significant amounts of time, sometimes even several hours a day, on their picking behavior. Although any part of the body may be attacked, often the face is the targeted area.    Currently, no specific cause has been identified for excoriation disorder. However, evidence demonstrates that the disorder is more common in individuals with obsessive-compulsive disorder and their parents, siblings or children than in the general population, suggesting that there is a genetic predisposition to the condition.    Individuals with compulsive skin picking often have a co-existing psychiatric disorder. The most common co-existing psychiatric conditions are major depression and anxiety disorders, especially obsessive-compulsive disorder (OCD). In one study, 52% of patients with compulsive skin picking were also diagnosed with OCD. An organic disease such as anemia or liver disease may also cause compulsive skin picking.     In many sufferers of compulsive skin picking, skin picking is preceded or accompanied by a high level of tension, anxiety or stress and a strong urge to itch or scratch. Often certain events or situations trigger skin-picking episodes. For some, the act of skin picking provides a feeling of relief or pleasure. Skin-picking episodes can be a conscious response to anxiety or may be done as an unconscious habit.  Compulsive skin picking appears to be more common in women than in men, and often starts in adolescence.  It may also be associated with methamphetamine or cocaine abuse.    Skin damage caused from compulsive skin picking can range from mild to extreme. Bleeding,  bruising and secondary infections are not uncommon. In severe cases, patients may create wounds so large that they require hospitalized care. Compulsive skin picking often leads to permanent disfigurement, shame and social impairment. Sufferers will often try to hide the damaged caused to their skin by wearing make-up and/or clothes to cover the marks and scars. In extreme cases, they will avoid social situations to hide their condition from those around them.    What treatment is available for excoriation?  Evidence suggests that both medication and cognitive-behavioral therapy (CBT) may effectively reduce symptoms of excoriation disorder  Compulsive skin picking stemming from a psychological disorder is best treated with psychotherapy. When compulsive skin picking is generally an unconscious habit the treatment of choice is a form of cognitive behavior therapy called Habit Reversal Training (HRT).  Medication: Successful treatment may include the use of selective serotonin reuptake inhibitors (SSRIs), which are antidepressants that also help reduce obsessive thoughts and compulsive behaviors. The drugs of choice are the selective serotonin reuptake inhibitors (SSRIs) such as fluoxetine, paroxetine, sertraline and fluvoxamine. Acetylcysteine has also been reported to be effective.  Cognitive-behavioral therapy (CBT): Cognitive-behavioral therapy helps individuals understand how their thoughts and behavior patterns are related in order to reduce repetitive behaviors. Individuals learn how to change their thoughts so that they can avoid picking at their skin.         Scribe Attestation      I,:  Gwendolyn Lin MA am acting as a scribe while in the presence of the attending physician.:       I,:  Karri Goddard MD personally performed the services described in this documentation    as scribed in my presence.:

## 2024-11-12 NOTE — PATIENT INSTRUCTIONS
"MELANOCYTIC NEVI (\"Moles\")      Assessment and Plan:  Based on a thorough discussion of this condition and the management approach to it (including a comprehensive discussion of the known risks, side effects and potential benefits of treatment), the patient (family) agrees to implement the following specific plan:  Provided handout with information regarding the ABCDE's of moles   Recommend routine skin exams every year   Sun avoidance, protective clothing (known as UPF clothing), and the use of at least SPF 30 sunscreens is advised. Sunscreen should be reapplied every two hours when outside.       SEBORRHEIC KERATOSIS; NON-INFLAMED      Assessment and Plan:  Based on a thorough discussion of this condition and the management approach to it (including a comprehensive discussion of the known risks, side effects and potential benefits of treatment), the patient (family) agrees to implement the following specific plan:  Reassured benign        ANGIOMA (\"CHERRY ANGIOMA\")    Assessment and Plan:  Reassured benign    EXCORIATIONS    Assessment and Plan:  Based on a thorough discussion of this condition and the management approach to it (including a comprehensive discussion of the known risks, side effects and potential benefits of treatment), the patient (family) agrees to implement the following specific plan:  Start Fluocinonide 0.05% solution- Apply topically two times a day.   NAC  n-acetyl cysteine 600mg twice daily      What is it?  Compulsive skin picking, or excoriation disorder, is characterized by the repetitive picking of one's own skin to the point of causing open sores that may bleed and leave scarring. This condition is also known as dermatillomania, pathological skin picking and neurotic excoriation.  Individuals may pick at healthy skin, minor skin irregularities (e.g., pimples or calluses), lesions, or scabs. This disorder is usually chronic, with periods of remission alternating with periods of greater " symptom intensity. If untreated, skin-picking behaviors may come and go for weeks, months, or years at a time. It is common for individuals with this disorder to spend significant amounts of time, sometimes even several hours a day, on their picking behavior. Although any part of the body may be attacked, often the face is the targeted area.    Currently, no specific cause has been identified for excoriation disorder. However, evidence demonstrates that the disorder is more common in individuals with obsessive-compulsive disorder and their parents, siblings or children than in the general population, suggesting that there is a genetic predisposition to the condition.    Individuals with compulsive skin picking often have a co-existing psychiatric disorder. The most common co-existing psychiatric conditions are major depression and anxiety disorders, especially obsessive-compulsive disorder (OCD). In one study, 52% of patients with compulsive skin picking were also diagnosed with OCD. An organic disease such as anemia or liver disease may also cause compulsive skin picking.     In many sufferers of compulsive skin picking, skin picking is preceded or accompanied by a high level of tension, anxiety or stress and a strong urge to itch or scratch. Often certain events or situations trigger skin-picking episodes. For some, the act of skin picking provides a feeling of relief or pleasure. Skin-picking episodes can be a conscious response to anxiety or may be done as an unconscious habit.  Compulsive skin picking appears to be more common in women than in men, and often starts in adolescence.  It may also be associated with methamphetamine or cocaine abuse.    Skin damage caused from compulsive skin picking can range from mild to extreme. Bleeding, bruising and secondary infections are not uncommon. In severe cases, patients may create wounds so large that they require hospitalized care. Compulsive skin picking often leads  to permanent disfigurement, shame and social impairment. Sufferers will often try to hide the damaged caused to their skin by wearing make-up and/or clothes to cover the marks and scars. In extreme cases, they will avoid social situations to hide their condition from those around them.    What treatment is available for excoriation?  Evidence suggests that both medication and cognitive-behavioral therapy (CBT) may effectively reduce symptoms of excoriation disorder  Compulsive skin picking stemming from a psychological disorder is best treated with psychotherapy. When compulsive skin picking is generally an unconscious habit the treatment of choice is a form of cognitive behavior therapy called Habit Reversal Training (HRT).  Medication: Successful treatment may include the use of selective serotonin reuptake inhibitors (SSRIs), which are antidepressants that also help reduce obsessive thoughts and compulsive behaviors. The drugs of choice are the selective serotonin reuptake inhibitors (SSRIs) such as fluoxetine, paroxetine, sertraline and fluvoxamine. Acetylcysteine has also been reported to be effective.  Cognitive-behavioral therapy (CBT): Cognitive-behavioral therapy helps individuals understand how their thoughts and behavior patterns are related in order to reduce repetitive behaviors. Individuals learn how to change their thoughts so that they can avoid picking at their skin.

## 2025-03-20 ENCOUNTER — TELEPHONE (OUTPATIENT)
Age: 80
End: 2025-03-20

## 2025-03-20 NOTE — TELEPHONE ENCOUNTER
Molly from [facility] called in to confirm if we received the patient referral to  ID . Shell was created referral and office notes are scanned into the patient chart .

## 2025-03-24 NOTE — TELEPHONE ENCOUNTER
Carolynn from [facility] called in to confirm if the patient has an upcoming appointment with ID . Please review the patient referral .

## 2025-03-25 NOTE — TELEPHONE ENCOUNTER
-Called [facility].  indicated that Georgina or Carolynn deal with referrals. Was transferred to Georgina. Transferred to voicemail. Requested call back and provided ID office phone number to discuss the patents referral.     -Was contacting the [facility] to discuss what additional input they are looking for as it relates to an infectious disease appointment. After reviewing the referral with Dr. Murray, she indicated that our office would defer eye exams to the ophthalmologist and as they have already started the patient on anti-viral eye drops, what additional information would benefit the patient. She is requesting if there is an additional question as it relates to the HSV infection in this patients case.

## 2025-03-28 NOTE — TELEPHONE ENCOUNTER
-Received a call from Dr. Ellis. She was returning a call into the office regarding a recent patient referral. Dr. Ellis indicated that the main reason the patient was referred to the infectious disease office was at the behest of the patients Oncology provider. Dr. Ellis indicated that when she contacted the patients oncology team to discuss if the prescription of Zirgan was cleared by their office, the oncology provider recommended an additional referral to ID, which Dr. Ellis subsequently placed.     -Dr. Ellis indicated that at this time the patient has been improving on the treatment she prescribed. Furthermore she does not have any specific ID concerns. She did indicated that the patients oncology provider should be contacted to see if they had any specific ID concerns that would warrant an ID consult. She was thanked for her time and information.